# Patient Record
Sex: MALE | Race: WHITE | NOT HISPANIC OR LATINO
[De-identification: names, ages, dates, MRNs, and addresses within clinical notes are randomized per-mention and may not be internally consistent; named-entity substitution may affect disease eponyms.]

---

## 2018-04-29 ENCOUNTER — TRANSCRIPTION ENCOUNTER (OUTPATIENT)
Age: 33
End: 2018-04-29

## 2023-07-17 PROBLEM — Z00.00 ENCOUNTER FOR PREVENTIVE HEALTH EXAMINATION: Status: ACTIVE | Noted: 2023-07-17

## 2023-08-23 ENCOUNTER — APPOINTMENT (OUTPATIENT)
Dept: NEUROLOGY | Facility: CLINIC | Age: 38
End: 2023-08-23
Payer: COMMERCIAL

## 2023-08-23 VITALS
HEIGHT: 72 IN | HEART RATE: 86 BPM | WEIGHT: 215 LBS | DIASTOLIC BLOOD PRESSURE: 66 MMHG | TEMPERATURE: 98 F | OXYGEN SATURATION: 99 % | SYSTOLIC BLOOD PRESSURE: 110 MMHG | BODY MASS INDEX: 29.12 KG/M2

## 2023-08-23 DIAGNOSIS — R20.0 ANESTHESIA OF SKIN: ICD-10-CM

## 2023-08-23 DIAGNOSIS — R20.2 ANESTHESIA OF SKIN: ICD-10-CM

## 2023-08-23 PROCEDURE — 99214 OFFICE O/P EST MOD 30 MIN: CPT

## 2023-08-23 NOTE — PHYSICAL EXAM
[FreeTextEntry1] : Constitutional: Well nourished, well developed adult in no distress Psych: Mood broad, affect not restricted Head: NCAT Eyes: Anicteric Ears: Normal appearing pinna Neck: Normal appearing and not enlarged Lungs: No accessory muscle use, no respiratory distress Cardiac/Vascular: No edema Abdomen: Non-distended Skin: No visible lesions or rashes. Skin is warm and dry  Neurological Examination Mental status: Awake, alert, appropriate.  Gives detailed history. oriented to person, place, and time.  Cranial nerves   II: Visual acuity grossly intact, visual fields full III, IV, VI: PERRLA, EOMI, no nystagmus  V: Facial sensation is normal B/L.     VII: Facial strength is normal B/L.   VIII:   Hearing grossly intact bilaterally to spoken voice although not formally assessed IX, X: Palate is midline and elevates symmetrically.     XI: Trapezius normal strength.     XII: Tongue midline without atrophy or fasciculations.      Speech: No aphasia, dysarthria.  Normal intonation and prosody of speech Motor: Normal muscle bulk and tone throughout.   Muscle Strength: 5/5 muscle strength throughout bilateral upper and lower extremities with assessed shoulder abduction, elbow flexion/extension, hand , hip flexion, knee flexion/extension, ankle plantarflexion/dorsiflexion Reflexes: 2+ reflexes throughout.  Coordination No ataxia - FTN within normal limits. No tremor or dysmetria Sensory: Sensation to light touch intact bilateral upper and lower extremities Station: No loss of balance. Gait  Normal stance, no loss of balance

## 2023-08-23 NOTE — ASSESSMENT
[FreeTextEntry1] : Patient is a 36yo male with unremarkable past medical history who presents for evaluation of acute onset paresthesia's in right 4-5 digits and right foot for which he presented to [facility] ED on 7/13/23.  He has had recurrence of the right hand tingling but no recurrence of RLE sensory abnormality.  Goals of care consistent with evaluating for underlying condition, patient does not wish to pursue medications for management at this time.  -MRI brain without contrast -EMG/NCS  -Consider PT pending EMG/NCS result -Follow up in 2 months to review results of studies

## 2023-08-23 NOTE — DATA REVIEWED
[de-identified] : CT head non-contrast/ CTA head and neck:   CT brain:   The CT examination demonstrates the ventricles, cisternal spaces, and cortical sulci to be within  normal limits. There is no midline shift or extra axial collections. The gray white differentiation  appears within normal limits. There is no intracranial hemorrhage or acute transcortical infarct.  The bony windows demonstrates no fractures. The visualized paranasal sinuses are within normal  limits. The mastoid air cells are well aerated.      CTA neck:      The CTA examination demonstrates the right common carotid artery to be normal in caliber. There is a normal bifurcation into the right internal and external carotid arteries. There is no  hemodynamically significant stenosis.      The left common carotid artery is normal in caliber. There is a normal bifurcation into the left  internal and external carotid arteries. There is no hemodynamically significant stenosis.       The vertebral arteries are normal in caliber.      The aortic arch appears intact without narrowing of the origin of the great vessels.      CTA brain:      The CTA examination demonstrates the internal carotid arteries to be normal in caliber. There is a  normal bifurcation into the A1 and M1 segments. There is a normal MCA bifurcation. The vertebral  arteries are normal in caliber. The basilar artery is normal in caliber. There is a normal  bifurcation into the posterior cerebral arteries. There are no areas of stenosis, dilatation or  aneurysm.       IMPRESSION:      No acute intracranial abnormality.      No evidence of large vessel occlusion, significant stenosis, vascular malformation or aneurysm  within the neck/intracranial circulation.

## 2023-08-23 NOTE — HISTORY OF PRESENT ILLNESS
[FreeTextEntry1] : Patient is a 38yo male with unremarkable past medical history who presents for evaluation of acute onset paresthesia's in right 4-5 digits and right foot for which he presented to [facility] ED on 7/13/23.  Patient was emergently evaluated by neurology and was not felt to be an appropriate candidate for stroke intervention given improvement in symptoms and no LVO on CTA.  He was seated at his desk for about 2.5 hours prior to symptom onset.  CTH and CTA were obtained which were unrevealing.    He denies any known connection to activity, time of day that causes aggravation of symptoms.  He describes it as pin prick tingling.  Sensation lasts a few minutes to a few hours.  He denies pain and numbness.  He has it on digits 3-5 on the right hand, dorsum and plantar aspect.  He has headaches mostly on the right side.  These are not necessarily new for the patient.  He has history of sinus headaches.  He denies neck pain.  He has never had recurrence of the RLE tingling.  ROS otherwise unremarkable.    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  He presents with his wife.

## 2024-06-25 ENCOUNTER — APPOINTMENT (OUTPATIENT)
Dept: PULMONOLOGY | Facility: CLINIC | Age: 39
End: 2024-06-25